# Patient Record
Sex: MALE | Race: WHITE | Employment: OTHER | URBAN - METROPOLITAN AREA
[De-identification: names, ages, dates, MRNs, and addresses within clinical notes are randomized per-mention and may not be internally consistent; named-entity substitution may affect disease eponyms.]

---

## 2017-12-18 ENCOUNTER — HOSPITAL ENCOUNTER (OUTPATIENT)
Age: 57
Discharge: HOME | End: 2017-12-18
Payer: COMMERCIAL

## 2017-12-18 VITALS
HEART RATE: 72 BPM | OXYGEN SATURATION: 98 % | SYSTOLIC BLOOD PRESSURE: 138 MMHG | DIASTOLIC BLOOD PRESSURE: 93 MMHG | TEMPERATURE: 98 F | RESPIRATION RATE: 16 BRPM

## 2017-12-18 DIAGNOSIS — S61.411A LACERATION OF RIGHT HAND WITHOUT FOREIGN BODY, INITIAL ENCOUNTER: Primary | ICD-10-CM

## 2017-12-18 PROCEDURE — 99213 OFFICE O/P EST LOW 20 MIN: CPT

## 2017-12-18 PROCEDURE — 12002 RPR S/N/AX/GEN/TRNK2.6-7.5CM: CPT

## 2017-12-18 PROCEDURE — 90471 IMMUNIZATION ADMIN: CPT

## 2017-12-18 NOTE — ED INITIAL ASSESSMENT (HPI)
Patient states he lacerated his right hand on a piece of corner metal used for drywall approximately 45 minutes ago. Unsure of last tetanus booster.

## 2017-12-19 NOTE — ED PROVIDER NOTES
Patient Seen in: [id number] [facility]    History   Patient presents with:  Laceration Abrasion (integumentary)    Stated Complaint: RIGHT HAND LACERATION    49-year-old male who presents to the immediate care with complaints of a a laceration Comment: Procedure: CAUDAL;  Surgeon: Tiffani Stinson MD;  Location: [address] [place] [address]    Family history reviewed and is not pertinent to presenting problem.     Smoking status: Former Smoker The wound is 4 cm to the dorsal aspect of the right hand. The wound was copiously irrigated with normal saline. The wound was prepped and draped in the normal sterile fashion.  The wound was anesthetized using 1% lidocaine without epinephrine, 2 mL's were u

## 2020-08-07 PROBLEM — M50.30 DDD (DEGENERATIVE DISC DISEASE), CERVICAL: Status: ACTIVE | Noted: 2020-08-07

## 2020-08-07 PROBLEM — M54.12 CERVICAL RADICULITIS: Status: ACTIVE | Noted: 2020-08-07

## 2022-12-01 ENCOUNTER — HOSPITAL ENCOUNTER (OUTPATIENT)
Age: 62
Discharge: HOME | End: 2022-12-01
Attending: EMERGENCY MEDICINE
Payer: COMMERCIAL

## 2022-12-01 VITALS
HEART RATE: 85 BPM | BODY MASS INDEX: 24.25 KG/M2 | OXYGEN SATURATION: 100 % | RESPIRATION RATE: 16 BRPM | HEIGHT: 68 IN | WEIGHT: 160 LBS | DIASTOLIC BLOOD PRESSURE: 86 MMHG | SYSTOLIC BLOOD PRESSURE: 131 MMHG | TEMPERATURE: 97 F

## 2022-12-01 DIAGNOSIS — B96.89 SEPTIC INFRAPATELLAR BURSITIS OF LEFT KNEE: Primary | ICD-10-CM

## 2022-12-01 DIAGNOSIS — M71.162 SEPTIC INFRAPATELLAR BURSITIS OF LEFT KNEE: Primary | ICD-10-CM

## 2022-12-01 PROCEDURE — 99203 OFFICE O/P NEW LOW 30 MIN: CPT

## 2022-12-01 RX ORDER — CLINDAMYCIN HYDROCHLORIDE 300 MG/1
600 CAPSULE ORAL 3 TIMES DAILY
Qty: 60 CAPSULE | Refills: 0 | Status: SHIPPED | OUTPATIENT
Start: 2022-12-01 | End: 2022-12-11

## 2022-12-01 NOTE — ED INITIAL ASSESSMENT (HPI)
Patient presents to IC with c/o left knee swelling and redness x 3 days. Draining serous. Did lindsey on Monday and was on knees. No fever.

## 2022-12-01 NOTE — DISCHARGE INSTRUCTIONS
Take 1000 mg acetaminophen (2 Tylenol tablets) and/or 600 mg ibuprofen (3 Advil tablets) every 6 hours as needed for pain or fever. Do not work putting pressure on your knees for the next 2 weeks. When you have to do it in the future, always use padding. You can take the without in 2 days by gently pulling on it. Keep the area clean and covered for the next week.

## 2023-06-01 ENCOUNTER — HOSPITAL ENCOUNTER (OUTPATIENT)
Age: 63
Discharge: HOME | End: 2023-06-01
Payer: COMMERCIAL

## 2023-06-01 VITALS
TEMPERATURE: 98 F | HEART RATE: 89 BPM | HEIGHT: 68 IN | SYSTOLIC BLOOD PRESSURE: 146 MMHG | OXYGEN SATURATION: 97 % | DIASTOLIC BLOOD PRESSURE: 83 MMHG | RESPIRATION RATE: 18 BRPM | WEIGHT: 160 LBS | BODY MASS INDEX: 24.25 KG/M2

## 2023-06-01 DIAGNOSIS — L03.011 CELLULITIS OF FINGER OF RIGHT HAND: Primary | ICD-10-CM

## 2023-06-01 PROCEDURE — 99203 OFFICE O/P NEW LOW 30 MIN: CPT | Performed by: NURSE PRACTITIONER

## 2023-06-01 RX ORDER — GABAPENTIN 300 MG/1
CAPSULE ORAL
COMMUNITY
Start: 2023-05-22

## 2023-06-01 RX ORDER — CEPHALEXIN 500 MG/1
500 CAPSULE ORAL 3 TIMES DAILY
Qty: 30 CAPSULE | Refills: 0 | Status: SHIPPED | OUTPATIENT
Start: 2023-06-01 | End: 2023-06-11

## 2023-06-01 NOTE — DISCHARGE INSTRUCTIONS
Follow-up with your primary care physician in one week if symptoms have not improved or symptoms are starting to get worse. Increase fluids, keep well-hydrated. Take Tylenol and Motrin for fever and pain.   Soak the finger in warm soapy water  Finish the full course antibiotics for 10 days

## 2023-06-01 NOTE — ED INITIAL ASSESSMENT (HPI)
Pt sts FB, thinks wood or formica,  to 2nd right finger several weeks ago. Has been picking at the area and has been able to express pus. Finger red and tender.

## 2025-03-24 ENCOUNTER — HOSPITAL ENCOUNTER (OUTPATIENT)
Age: 65
Discharge: HOME | End: 2025-03-24
Payer: COMMERCIAL

## 2025-03-24 VITALS
OXYGEN SATURATION: 100 % | DIASTOLIC BLOOD PRESSURE: 73 MMHG | SYSTOLIC BLOOD PRESSURE: 139 MMHG | TEMPERATURE: 99 F | HEART RATE: 83 BPM | RESPIRATION RATE: 16 BRPM

## 2025-03-24 DIAGNOSIS — J11.1 INFLUENZA: ICD-10-CM

## 2025-03-24 DIAGNOSIS — R05.9 COUGH: Primary | ICD-10-CM

## 2025-03-24 LAB
POCT INFLUENZA A: POSITIVE
POCT INFLUENZA B: NEGATIVE

## 2025-03-24 PROCEDURE — 99213 OFFICE O/P EST LOW 20 MIN: CPT | Performed by: NURSE PRACTITIONER

## 2025-03-24 PROCEDURE — 87502 INFLUENZA DNA AMP PROBE: CPT | Performed by: NURSE PRACTITIONER

## 2025-03-24 RX ORDER — METFORMIN HYDROCHLORIDE 500 MG/1
2 TABLET, EXTENDED RELEASE ORAL
COMMUNITY
Start: 2025-02-19

## 2025-03-24 RX ORDER — BENZONATATE 100 MG/1
100 CAPSULE ORAL 3 TIMES DAILY PRN
Qty: 30 CAPSULE | Refills: 0 | Status: SHIPPED | OUTPATIENT
Start: 2025-03-24 | End: 2025-04-23

## 2025-03-24 RX ORDER — OSELTAMIVIR PHOSPHATE 75 MG/1
75 CAPSULE ORAL 2 TIMES DAILY
Qty: 10 CAPSULE | Refills: 0 | Status: SHIPPED | OUTPATIENT
Start: 2025-03-24 | End: 2025-03-29

## 2025-03-24 NOTE — DISCHARGE INSTRUCTIONS
Over-the-counter Mucinex 600 mg twice a day for the next 5 to 7 days    For the runny nose and postnasal drip, try over-the-counter Zyrtec 10 mg daily for the next 7 days    For the nasal congestion, try a cool-mist humidifier in the same room at night near the bedside, steam inhalation such as hot steam showers, sinus rinses such as the [place] pot.